# Patient Record
Sex: FEMALE | Race: OTHER | Employment: OTHER | URBAN - METROPOLITAN AREA
[De-identification: names, ages, dates, MRNs, and addresses within clinical notes are randomized per-mention and may not be internally consistent; named-entity substitution may affect disease eponyms.]

---

## 2021-01-06 NOTE — PATIENT DISCUSSION
- Follow up in 1 year for [facility]

## 2021-03-04 NOTE — PATIENT DISCUSSION
Patient advised of the right to post-operative care by the surgeon. Patient is fully informed of, and agreed to, co-management with their primary optometric physician. Post-operative care by the surgeon is not medically necessary and co-management is clinically appropriate. Patient has received itemization of fees related to cataract surgery. Transfer of care letter completed for the patient. Transfer care of right eye to Dr. Renetta Rico on 3.5.2021. Patient instructed to call immediately if any new distortion, blurring, decreased vision or eye pain.

## 2021-07-08 ENCOUNTER — NEW PATIENT COMPREHENSIVE (OUTPATIENT)
Dept: URBAN - METROPOLITAN AREA CLINIC 39 | Facility: CLINIC | Age: 51
End: 2021-07-08

## 2021-07-08 DIAGNOSIS — H52.4: ICD-10-CM

## 2021-07-08 DIAGNOSIS — H52.13: ICD-10-CM

## 2021-07-08 DIAGNOSIS — H52.201: ICD-10-CM

## 2021-07-08 PROCEDURE — 92004 COMPRE OPH EXAM NEW PT 1/>: CPT

## 2021-07-08 PROCEDURE — 92015 DETERMINE REFRACTIVE STATE: CPT

## 2021-07-08 RX ORDER — MINERAL OIL 2; 2 MG/.4ML; MG/.4ML: 1 EMULSION OPHTHALMIC

## 2021-07-08 ASSESSMENT — VISUAL ACUITY
OD_SC: J4
OS_SC: J2
OS_SC: 20/20-2
OD_SC: 20/30-1

## 2021-07-08 ASSESSMENT — TONOMETRY
OD_IOP_MMHG: 14
OS_IOP_MMHG: 14

## 2022-08-22 ENCOUNTER — COMPREHENSIVE EXAM (OUTPATIENT)
Dept: URBAN - METROPOLITAN AREA CLINIC 39 | Facility: CLINIC | Age: 52
End: 2022-08-22

## 2022-08-22 DIAGNOSIS — H52.4: ICD-10-CM

## 2022-08-22 DIAGNOSIS — H52.13: ICD-10-CM

## 2022-08-22 DIAGNOSIS — H52.201: ICD-10-CM

## 2022-08-22 PROCEDURE — 92014 COMPRE OPH EXAM EST PT 1/>: CPT

## 2022-08-22 PROCEDURE — 92015 DETERMINE REFRACTIVE STATE: CPT

## 2022-08-22 ASSESSMENT — VISUAL ACUITY
OS_SC: J2
OS_SC: 20/25-2
OD_SC: J3
OU_SC: J1+
OU_SC: 20/25
OD_SC: 20/40

## 2022-08-22 ASSESSMENT — TONOMETRY
OD_IOP_MMHG: 14
OS_IOP_MMHG: 16